# Patient Record
Sex: FEMALE | Race: WHITE
[De-identification: names, ages, dates, MRNs, and addresses within clinical notes are randomized per-mention and may not be internally consistent; named-entity substitution may affect disease eponyms.]

---

## 2020-05-13 ENCOUNTER — HOSPITAL ENCOUNTER (INPATIENT)
Dept: HOSPITAL 56 - MW.OBCHECK | Age: 25
LOS: 1 days | Discharge: HOME | DRG: 560 | End: 2020-05-14
Attending: OBSTETRICS & GYNECOLOGY | Admitting: OBSTETRICS & GYNECOLOGY
Payer: COMMERCIAL

## 2020-05-13 DIAGNOSIS — Z87.891: ICD-10-CM

## 2020-05-13 DIAGNOSIS — F32.9: ICD-10-CM

## 2020-05-13 DIAGNOSIS — Z3A.39: ICD-10-CM

## 2020-05-13 DIAGNOSIS — F41.9: ICD-10-CM

## 2020-05-13 PROCEDURE — 3E0R3BZ INTRODUCTION OF ANESTHETIC AGENT INTO SPINAL CANAL, PERCUTANEOUS APPROACH: ICD-10-PCS | Performed by: OBSTETRICS & GYNECOLOGY

## 2020-05-13 PROCEDURE — 00HU33Z INSERTION OF INFUSION DEVICE INTO SPINAL CANAL, PERCUTANEOUS APPROACH: ICD-10-PCS | Performed by: OBSTETRICS & GYNECOLOGY

## 2020-05-13 NOTE — OR
SURGEON:

Khadijah Yeager MD

 

DATE OF PROCEDURE:  2020

 

PREOPERATIVE DIAGNOSES:

1. A 25-year-old G3, P2-0-0-2 at 39 weeks and 4 days gestation.

2. Labor.

3. GBS positive.

 

POSTOPERATIVE DIAGNOSES:

1. 25-year-old, G3, P3-0-0-3, status post spontaneous vaginal delivery at 39

    weeks and 4 days gestation.

2. Group B Streptococcus positive.

 

PROCEDURE:

Spontaneous vaginal delivery.

 

ANESTHESIA:

Epidural.

 

PRIMARY SURGEON:

Khadijah Yeager MD

 

ESTIMATED BLOOD LOSS:

200 mL.

 

FINDINGS:

Live male infant in cephalic presentation.  Apgar score 7 and 9 at one and five

minutes respectively.  Thick meconium.  Weight pending.  Venous blood gas

pending.  Placenta intact with 3-vessel cord.  No perineal lacerations.

 

INDICATIONS:

This is a 25-year-old G3, P2-0-0-2, who presented at 39 weeks and 4 days

gestation complaining of contractions and leakage of fluid.  AmniSure was

positive on presentation.  Her cervix was found to be 6 cm dilated.  She

received an epidural for pain control.  She progressed to complete cervical

dilation.  She received 1 dose of Ampicillin prior to delivery.

 

PROCEDURE IN DETAIL:

I presented to the room, the patient was complete and +1 station.  She pushed

and delivered a live male infant.  Head was delivered followed quickly by the

shoulders and remainder of the body.  The infant was placed on maternal abdomen.

After approximately 60 seconds, cord was clamped and cut.  Perineum was

inspected and no lacerations were noted.  At 29 minutes post delivery, the

placenta delivered via the Sunshine-Reed maneuver intact and with 3-vessel

cord.  Fundus was firm below the umbilicus with minimal bleeding.  The patient

and infant tolerated the delivery well.

 

 

[id number] / MODL

DD:  2020 07:45:53

DT:  2020 14:41:06

Job #:  [id number]

LOGAN

## 2020-05-13 NOTE — PCM.DEL
L & D Note





- General Info


Date of Service: 20


Mother's Due Date: 20





- Delivery Note


Labor: Spontaneous


Delivery Outcome: Livebirth


Infant Delivery Method: Spontaneous Vaginal Delivery-Single


Birth Presentation: Vertex


Nuchal Cord: None


Anesthesia Type: Epidural


Amniotic Fluid Description: Meconium Stained


Episiotomy Type: None


Laceration: None


Placenta: Intact, Spontaneous


Cord: 3 Vessels


Resuscitation Needed: Yes


: Suctioned, Stimulated, Warmed, Warmer Used


APGAR Score 1 min: 7


APGAR Score 5 min: 9





- General Info


Date of Service: 20





- Patient Data


Weight - Most Recent: 77.111 kg


Lab Results Last 24 Hours: 


 Laboratory Results - last 24 hr











  20 Range/Units





  03:20 03:45 03:45 


 


WBC   11.60 H   (4.0-11.0)  K/uL


 


RBC   4.33   (4.30-5.90)  M/uL


 


Hgb   10.5 L   (12.0-16.0)  g/dL


 


Hct   33.8 L   (36.0-46.0)  %


 


MCV   78.1 L   (80.0-98.0)  fL


 


MCH   24.2 L   (27.0-32.0)  pg


 


MCHC   31.1   (31.0-37.0)  g/dL


 


RDW Std Deviation   43.0   (28.0-62.0)  fl


 


RDW Coeff of Jeny   15   (11.0-15.0)  %


 


Plt Count   189   (150-400)  K/uL


 


MPV   12.50 H   (7.40-12.00)  fL


 


Nucleated RBC %   0.0   /100WBC


 


Nucleated RBCs #   0   K/uL


 


Fetal Membrane Rupture  POSITIVE    


 


Blood Type    O POSITIVE  


 


Antibody Screen    NEGATIVE  











Med Orders - Current: 


 Current Medications





Acetaminophen (Tylenol Extra Strength)  1,000 mg PO Q6H PRN


   PRN Reason: Pain


Benzocaine/Menthol (Dermoplast Pain Relief 20%-0.5% Spray)  78 gm TOP 

ASDIRECTED PRN


   PRN Reason: Perineal Comfort Measure


Bisacodyl (Dulcolax)  10 mg RECTAL ONETIME PRN


   PRN Reason: Constipation


Butorphanol Tartrate (Stadol)  1 mg IVPUSH Q1H PRN


   PRN Reason: Pain


Carboprost Tromethamine (Hemabate Ds)  250 mcg IM ASDIRECTED PRN


   PRN Reason: Post Partum Hemorrhage


Docusate Sodium (Colace)  100 mg PO BID PRN


   PRN Reason: Constipation


Emollient Ointment (Lansinoh Hpa)  0 gm TOP ASDIRECTED PRN


   PRN Reason: Sore Nipples


Ampicillin Sodium 1 gm/ Sodium (Chloride)  50 mls @ 100 mls/hr IV Q4H [facility]


Lactated Ringer's (Ringers, Lactated)  1,000 mls @ 150 mls/hr IV ASDIRECTED [facility]


   Last Admin: 20 04:57 Dose:  150 mls/hr


Oxytocin/Sodium Chloride (Oxytocin 30 Unit/500 Ml-Ns)  30 unit in 500 mls @ 999 

mls/hr IV TITRATE [facility]


   Last Admin: 20 06:58 Dose:  999 mls/hr


Tranexamic Acid 1,000 mg/ (Sodium Chloride)  110 mls @ 660 mls/hr IV ONETIME PRN


   PRN Reason: Bleeding


Ibuprofen (Motrin)  800 mg PO Q8H PRN


   PRN Reason: Pain


Lidocaine HCl (Xylocaine 1%)  50 ml INJECT ONETIME PRN


   PRN Reason: Laceration repair


Methylergonovine Maleate (Methergine)  0.2 mg IM ASDIRECTED PRN


   PRN Reason: Post Partum Hemorrhage


Misoprostol (Cytotec)  200 mcg PO ONETIME PRN


   PRN Reason: Post Partum Hemorrhage


Nalbuphine HCl (Nubain)  10 mg IVPUSH Q1H PRN


   PRN Reason: Pain (severe 7-10)


Non-Formulary Medication (Escitalopram Oxalate)  20 mg PO DAILY [facility]


Oxycodone HCl (Oxycodone)  5 mg PO Q2H PRN


   PRN Reason: Pain


Sodium Chloride (Saline Flush)  10 ml FLUSH ASDIRECTED PRN


   PRN Reason: Keep Vein Open


Sodium Chloride (Saline Flush)  2.5 ml FLUSH ASDIRECTED PRN


   PRN Reason: Keep Vein Open


Sodium Chloride (Normal Saline)  10 ml IV ASDIRECTED PRN


   PRN Reason: IV Use


Sterile Water (Sterile Water For Irrigation)  1,000 ml IRR ASDIRECTED PRN


   PRN Reason: delivery


   Last Admin: 20 07:03 Dose:  1,000 ml


Witch Hazel (Tucks)  1 pad TOP ASDIRECTED PRN


   PRN Reason: comfort care





Discontinued Medications





Fentanyl (Sublimaze) Confirm Administered Dose 100 mcg .ROUTE .STK-MED ONE


   Stop: 20 04:19


   Last Admin: 20 04:47 Dose:  Not Given


Ampicillin Sodium 2 gm/ Sodium (Chloride)  100 mls @ 200 mls/hr IV ONETIME ONE


   Stop: 20 04:29


   Last Admin: 20 03:54 Dose:  200 mls/hr


Ropivacaine (Naropin 0.2%) Confirm Administered Dose 100 mls @ as directed 

.ROUTE .STK-MED ONE


   Stop: 20 04:19


   Last Admin: 20 04:46 Dose:  Not Given











- Problem List & Annotations


(1) Vaginal delivery


SNOMED Code(s): 954994730


   Code(s): O80 - ENCOUNTER FOR FULL-TERM UNCOMPLICATED DELIVERY   Status: 

Acute   Current Visit: No   





- Problem List Review


Problem List Initiated/Reviewed/Updated: Yes





- My Orders


Last 24 Hours: 


My Active Orders





20 03:11


Patient Status [ADT] Routine 





20 03:36


Fetal Heart Tones [RC] CONTINUOUS 


Fetal Non Stress Test [RC] PER UNIT ROUTINE 


May Shower [RC] ASDIRECTED 


Notify Provider [RC] PRN 


Up ad Vani [RC] ASDIRECTED 


Vaginal Exam [RC] PRN 


Vital Signs [RC] PER UNIT ROUTINE 


Butorphanol [Stadol]   1 mg IVPUSH Q1H PRN 


Carboprost Tromethamine [Hemabate DS]   250 mcg IM ASDIRECTED PRN 


Lidocaine 1% [Xylocaine 1%]   50 ml INJECT ONETIME PRN 


Methylergonovine [Methergine]   0.2 mg IM ASDIRECTED PRN 


Nalbuphine [Nubain]   10 mg IVPUSH Q1H PRN 


Sodium Chloride 0.9% [Normal Saline]   10 ml IV ASDIRECTED PRN 


Sodium Chloride 0.9% [Saline Flush]   10 ml FLUSH ASDIRECTED PRN 


Sodium Chloride 0.9% [Saline Flush]   2.5 ml FLUSH ASDIRECTED PRN 


Tranexamic Acid [Cyklokapron] 1,000 mg   Sodium Chloride 0.9% [Normal Saline] 

100 ml IV ONETIME 


Water For Irrigation,Sterile [Sterile Water for Irrigation]   1,000 ml IRR 

ASDIRECTED PRN 


miSOPROStoL [Cytotec]   200 mcg PO ONETIME PRN 


Fetal Scalp Electrode [WOMSER] Per Unit Routine 


Peripheral IV Insertion Adult [OM.PC] Routine 


Resuscitation Status Routine 





20 03:45


RPR (SYPHILIS SERO) W/ RFLX [REF] Routine 


Lactated Ringers [Ringers, Lactated] 1,000 ml IV ASDIRECTED 


Oxytocin/0.9 % Sodium Chloride [Oxytocin 30 Unit/500 ML-NS] 30 unit in 500 ml 

IV TITRATE 





20 07:36


Patient Status [ADT] Routine 


May Shower [RC] ASDIRECTED 


Notify Provider Vital Signs [RC] ASDIRECTED 


Up ad Vani [RC] ASDIRECTED 


Vital Signs [RC] PER UNIT ROUTINE 


Acetaminophen [Tylenol Extra Strength]   1,000 mg PO Q6H PRN 


Benzocaine/Menthol [Dermoplast Pain Relief 20%-0.5% Spray]   78 gm TOP 

ASDIRECTED PRN 


Docusate Sodium [Colace]   100 mg PO BID PRN 


Ibuprofen [Motrin]   800 mg PO Q8H PRN 


Lanolin [Lansinoh HPA]   See Dose Instructions  TOP ASDIRECTED PRN 


bisacodyL [Dulcolax]   10 mg RECTAL ONETIME PRN 


oxyCODONE   5 mg PO Q2H PRN 


witch hazeL [Tucks]   1 pad TOP ASDIRECTED PRN 


Assess Lochia [WOMSER] Per Unit Routine 


Assess Uterine Involution [WOMSER] Per Unit Routine 


Breast Pump [WOMSER] Per Unit Routine 


Ice Therapy [OM.PC] Per Unit Routine 


Perineal Care [OM.PC] Per Unit Routine 


Peripheral IV Discontinue [OM.PC] Routine 


Sitz Bath [OM.PC] Per Unit Routine 





20 07:37


Cooling Warming Measures [RC] ASDIRECTED 


BLOOD GAS VENOUS UMBILICAL [BG] Routine 





20 08:00


Ampicillin 1 gm   Sodium Chloride 0.9% [Normal Saline] 50 ml IV Q4H 





20 09:00


Escitalopram Oxalate   20 mg PO DAILY 





20 Breakfast


Regular Diet [DIET] 





20 05:11


HEMOGLOBIN/HEMATOCRIT,HH [HEME] Timed 














- Assessment


Assessment:: 





26yo  s/p  at 39w4d





- Plan


Plan:: 





Admit to postpartum unit for routine care.


GBS+, did not receive adequate prophylaxis. Patient aware infant may be 

monitored for 48 hours postpartum.


Depression/anxiety, continue home medication.

## 2020-05-13 NOTE — PCM.PRNOTE
- Free Text/Narrative


Note: 





Anes NOte





Patient requests epidural for L&D. Sitting position, Level L3-L4 midline 

approach. Sterile technique. 





Chloraprep scrub to lumbar area. Sterile fenestrated drape applied.





Epidural space easily achieved single attempt with ease using LONI technique. 

LNOI at 3 cm. Cath threaded 5 cm with ease. Cath secured a tskin at 9 cm using 

sterile clear adhesive dressing.





0425 Test 3 cc 1.5% lido with epi negative.





0430 Load 10 cc 0.2% ropivicaine with 1 mcg cc fentanyl in slow divided doses.





0435 Pump started with 90 cc same solution. Rate is 8 cc hr with 6 cc q 20 min 

prn bolus. 





[place] well.





Time with patient [id number]





Raudel Valentin CRNA

## 2020-05-14 NOTE — PCM.PNPP
- General Info


Date of Service: 20


Functional Status: Reports: Pain Controlled, Tolerating Diet, Ambulating, 

Urinating





- Review of Systems


General: Denies: Fever, Weakness, Fatigue


Pulmonary: Denies: Shortness of Breath


Cardiovascular: Denies: Chest Pain, Palpitations, Lightheadedness


Gastrointestinal: Denies: Abdominal Pain, Nausea, Vomiting


Genitourinary: Denies: Flank Pain


Skin: Reports: No Symptoms


Neurological: Reports: No Symptoms


Psychiatric: Reports: No Symptoms





- General Info


Date of Service: 20





- Patient Data


Vital Signs - Most Recent: 


 Last Vital Signs











Temp  35.8 C L  20 04:53


 


Pulse  74   20 04:53


 


Resp  15   20 04:53


 


BP  104/78   20 06:30


 


Pulse Ox  95   20 04:53











Weight - Most Recent: 77.111 kg


Lab Results - Last 24 Hours: 


 Laboratory Results - last 24 hr











  20 Range/Units





  05:16 


 


Hgb  10.4 L  (12.0-16.0)  g/dL


 


Hct  34.3 L  (36.0-46.0)  %











Med Orders - Current: 


 Current Medications





Acetaminophen (Tylenol Extra Strength)  1,000 mg PO Q6H PRN


   PRN Reason: Pain


   Last Admin: 20 05:15 Dose:  1,000 mg


Benzocaine/Menthol (Dermoplast Pain Relief 20%-0.5% Spray)  78 gm TOP 

ASDIRECTED PRN


   PRN Reason: Perineal Comfort Measure


   Last Admin: 20 08:53 Dose:  1 spray


Bisacodyl (Dulcolax)  10 mg RECTAL ONETIME PRN


   PRN Reason: Constipation


Butorphanol Tartrate (Stadol)  1 mg IVPUSH Q1H PRN


   PRN Reason: Pain


Carboprost Tromethamine (Hemabate Ds)  250 mcg IM ASDIRECTED PRN


   PRN Reason: Post Partum Hemorrhage


Docusate Sodium (Colace)  100 mg PO BID PRN


   PRN Reason: Constipation


Emollient Ointment (Lansinoh Hpa)  0 gm TOP ASDIRECTED PRN


   PRN Reason: Sore Nipples


   Last Admin: 20 08:53 Dose:  1 gm


Escitalopram Oxalate (Lexapro)  20 mg PO DAILY [facility]


   Last Admin: 20 08:50 Dose:  20 mg


Ampicillin Sodium 1 gm/ Sodium (Chloride)  50 mls @ 100 mls/hr IV Q4H [facility]


   Last Admin: 20 08:40 Dose:  Not Given


Lactated Ringer's (Ringers, Lactated)  1,000 mls @ 150 mls/hr IV ASDIRECTED [facility]


   Last Admin: 20 04:57 Dose:  150 mls/hr


Oxytocin/Sodium Chloride (Oxytocin 30 Unit/500 Ml-Ns)  30 unit in 500 mls @ 999 

mls/hr IV TITRATE [facility]


   Last Admin: 20 06:58 Dose:  999 mls/hr


Tranexamic Acid 1,000 mg/ (Sodium Chloride)  110 mls @ 660 mls/hr IV ONETIME PRN


   PRN Reason: Bleeding


Ibuprofen (Motrin)  800 mg PO Q8H PRN


   PRN Reason: Pain


   Last Admin: 20 21:52 Dose:  800 mg


Lidocaine HCl (Xylocaine 1%)  50 ml INJECT ONETIME PRN


   PRN Reason: Laceration repair


Methylergonovine Maleate (Methergine)  0.2 mg IM ASDIRECTED PRN


   PRN Reason: Post Partum Hemorrhage


Misoprostol (Cytotec)  200 mcg PO ONETIME PRN


   PRN Reason: Post Partum Hemorrhage


Nalbuphine HCl (Nubain)  10 mg IVPUSH Q1H PRN


   PRN Reason: Pain (severe 7-10)


Oxycodone HCl (Oxycodone)  5 mg PO Q2H PRN


   PRN Reason: Pain


Sodium Chloride (Saline Flush)  10 ml FLUSH ASDIRECTED PRN


   PRN Reason: Keep Vein Open


Sodium Chloride (Saline Flush)  2.5 ml FLUSH ASDIRECTED PRN


   PRN Reason: Keep Vein Open


Sodium Chloride (Normal Saline)  10 ml IV ASDIRECTED PRN


   PRN Reason: IV Use


Sterile Water (Sterile Water For Irrigation)  1,000 ml IRR ASDIRECTED PRN


   PRN Reason: delivery


   Last Admin: 20 07:03 Dose:  1,000 ml


Witch Hazel (Tucks)  1 pad TOP ASDIRECTED PRN


   PRN Reason: comfort care


   Last Admin: 20 08:52 Dose:  1 pad





Discontinued Medications





Fentanyl (Sublimaze) Confirm Administered Dose 100 mcg .ROUTE .STK-MED ONE


   Stop: 20 04:19


   Last Admin: 20 04:47 Dose:  Not Given


Ampicillin Sodium 2 gm/ Sodium (Chloride)  100 mls @ 200 mls/hr IV ONETIME ONE


   Stop: 20 04:29


   Last Admin: 20 03:54 Dose:  200 mls/hr


Ropivacaine (Naropin 0.2%) Confirm Administered Dose 100 mls @ as directed 

.ROUTE .STK-MED ONE


   Stop: 20 04:19


   Last Admin: 20 04:46 Dose:  Not Given











- Infant Interaction


Support Person: Significant Other





- Postpartum Recovery Exam


Fundal Tone: Firm


Fundal Level: 1 Fingerbreadths Below Umbilicus


Fundal Placement: Midline


Lochia Amount: Scant


Lochia Color: Rubra/Red


Bladder Status: Voiding





- Exam


General: Alert, Oriented


Lungs: Normal Respiratory Effort


Cardiovascular: Regular Rate, Regular Rhythm


GI/Abdominal Exam: Normal Bowel Sounds, Soft


Extremities: Normal Range of Motion, Pedal Edema (trace).  No: Delia's Sign


Skin: Warm, Dry, Intact


Neurological: No New Focal Deficit


Psy/Mental Status: Alert, Normal Affect, Normal Mood





- Problem List & Annotations


(1) Vaginal delivery


SNOMED Code(s): 304118364


   Code(s): O80 - ENCOUNTER FOR FULL-TERM UNCOMPLICATED DELIVERY   Status: 

Acute   Current Visit: No   





- Problem List Review


Problem List Initiated/Reviewed/Updated: Yes





- My Orders


Last 24 Hours: 


My Active Orders





20 08:45


Ready for Discharge [RC] PER UNIT ROUTINE 














- Assessment


Assessment:: 





24yo  s/p  at 39w4d PPD 1





- Plan


Plan:: 





Patient doing well overall, would like to go home today.  Discharge to home. 

Follow up at [facility] 6 weeks. Infection and bleeding warnings reviewed.

## 2020-05-14 NOTE — [id number]
Post Anesthesia Note





- EVALUATION WITHIN 48HRS OF ANESTHETIC


Vital Signs in Normal Range: Yes


Patient Participated in Evaluation: Yes


Respiratory Function Stable: Yes


Airway Patent: Yes


Cardiovascular Function Stable: Yes


Hydration Status Stable: Yes


Pain Control Satisfactory: Yes


Nausea and Vomiting Control Satisfactory: Yes


Mental Status Recovered: Yes


Vital Signs: 


 Last Vital Signs











Temp  35.8 C L  05/14/20 04:53


 


Pulse  74   05/14/20 04:53


 


Resp  15   05/14/20 04:53


 


BP  104/78   05/14/20 06:30


 


Pulse Ox  95   05/14/20 04:53

## 2021-12-02 ENCOUNTER — HOSPITAL ENCOUNTER (EMERGENCY)
Dept: HOSPITAL 56 - MW.ED | Age: 26
Discharge: HOME | End: 2021-12-02
Payer: COMMERCIAL

## 2021-12-02 DIAGNOSIS — Z20.822: ICD-10-CM

## 2021-12-02 DIAGNOSIS — N30.00: Primary | ICD-10-CM

## 2021-12-02 LAB
BUN SERPL-MCNC: 11 MG/DL (ref 7–18)
CHLORIDE SERPL-SCNC: 103 MMOL/L (ref 98–107)
CO2 SERPL-SCNC: 26.6 MMOL/L (ref 21–32)
FLUAV RNA UPPER RESP QL NAA+PROBE: NEGATIVE
FLUBV RNA UPPER RESP QL NAA+PROBE: NEGATIVE
GLUCOSE SERPL-MCNC: 113 MG/DL (ref 74–106)
LIPASE SERPL-CCNC: 85 U/L (ref 73–393)
POTASSIUM SERPL-SCNC: 3.6 MMOL/L (ref 3.5–5.1)
SARS-COV-2 RNA RESP QL NAA+PROBE: NEGATIVE
SODIUM SERPL-SCNC: 142 MMOL/L (ref 136–145)

## 2021-12-02 PROCEDURE — 74177 CT ABD & PELVIS W/CONTRAST: CPT

## 2021-12-02 PROCEDURE — 0240U: CPT

## 2021-12-02 PROCEDURE — 36415 COLL VENOUS BLD VENIPUNCTURE: CPT

## 2021-12-02 PROCEDURE — 71045 X-RAY EXAM CHEST 1 VIEW: CPT

## 2021-12-02 PROCEDURE — 81001 URINALYSIS AUTO W/SCOPE: CPT

## 2021-12-02 PROCEDURE — 99284 EMERGENCY DEPT VISIT MOD MDM: CPT

## 2021-12-02 PROCEDURE — 80053 COMPREHEN METABOLIC PANEL: CPT

## 2021-12-02 PROCEDURE — 83690 ASSAY OF LIPASE: CPT

## 2021-12-02 PROCEDURE — 96374 THER/PROPH/DIAG INJ IV PUSH: CPT

## 2021-12-02 PROCEDURE — 81025 URINE PREGNANCY TEST: CPT

## 2021-12-02 PROCEDURE — 85025 COMPLETE CBC W/AUTO DIFF WBC: CPT

## 2021-12-02 NOTE — CT
INDICATION:



Nausea, vomiting, generalized abdominal pain.



TECHNIQUE:



CT of the abdomen and pelvis with 100 cc Isovue 370 IV contrast. Coronal 

and sagittal reconstructions.



COMPARISON:



None. 



FINDINGS:



Small low-attenuation lesion in the anterior aspect of the hepatic dome is 

too small to characterize but likely benign (series 201, image 31). The 

gallbladder, spleen, pancreas, and adrenal glands are negative. No biliary 

dilation. Hepatic and portal veins are patent.



Symmetric enhancement of the kidneys. No hydronephrosis or ureteral 

dilation. No obstructing urinary calculi identified. The bladder and uterus 

are normal in appearance. There is a 1.5 cm rim enhancing cystic lesion in 

the right adnexa which most likely represents a corpus luteum (series 201, 

image 142).



No bowel dilation. Mild amount of formed stool within the proximal colon. 

The distal colon is decompressed which limits evaluation, however there are 

no significant signs of inflammation. Negative appendix. Small amount of 

free fluid in the right adnexa. No intraperitoneal free air.



Tiny fat containing umbilical hernia. No lymphadenopathy. The bones are 

unremarkable. The lung bases are clear.



IMPRESSION:



1. No acute findings in the abdomen or pelvis. 



2. Probable 1.5 cm corpus luteum in the right ovary with a small amount of 

adjacent free fluid.



Please note that all CT scans at this facility use dose modulation, 

iterative reconstruction, and/or weight-based dosing when appropriate to 

reduce radiation dose to as low as reasonably achievable.



Dictated by Yennifer Lyons MD @ 12/2/2021 12:43:48 PM



(Electronically Signed)

## 2021-12-02 NOTE — CR
INDICATION: cough



TECHNIQUE:



Chest 1 view. 



COMPARISON:



None. 



FINDINGS:



Cardiovascular and mediastinum: Heart size and vasculature are normal in 

caliber and appearance.  Mediastinum is within normal limits.  



Lungs and pleural space: Lungs are clear.  No sign of infiltrate or mass.  

No sign of pleural effusion.  No pneumothorax.  



Bones and soft tissues: No significant findings.  



IMPRESSION:



Unremarkable chest.



Dictated by: Addi Whitt MD @ 12/02/2021 11:04:24



(Electronically Signed)